# Patient Record
Sex: FEMALE | Race: WHITE | HISPANIC OR LATINO | Employment: FULL TIME | URBAN - METROPOLITAN AREA
[De-identification: names, ages, dates, MRNs, and addresses within clinical notes are randomized per-mention and may not be internally consistent; named-entity substitution may affect disease eponyms.]

---
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
## 2025-03-16 ENCOUNTER — HEALTH MAINTENANCE LETTER (OUTPATIENT)
Age: 20
End: 2025-03-16

## 2025-05-11 ENCOUNTER — HOSPITAL ENCOUNTER (EMERGENCY)
Facility: HOSPITAL | Age: 20
Discharge: HOME | End: 2025-05-11
Admitting: EMERGENCY MEDICINE
Payer: COMMERCIAL

## 2025-05-11 ENCOUNTER — APPOINTMENT (OUTPATIENT)
Dept: RADIOLOGY | Facility: HOSPITAL | Age: 20
End: 2025-05-11
Attending: EMERGENCY MEDICINE
Payer: COMMERCIAL

## 2025-05-11 VITALS
DIASTOLIC BLOOD PRESSURE: 69 MMHG | TEMPERATURE: 99 F | OXYGEN SATURATION: 100 % | RESPIRATION RATE: 16 BRPM | SYSTOLIC BLOOD PRESSURE: 123 MMHG | HEART RATE: 86 BPM | BODY MASS INDEX: 28.07 KG/M2 | HEIGHT: 63 IN | WEIGHT: 158.4 LBS

## 2025-05-11 DIAGNOSIS — J06.9 VIRAL URI: ICD-10-CM

## 2025-05-11 PROBLEM — L21.9 SEBORRHEIC DERMATITIS: Status: ACTIVE | Noted: 2023-08-29

## 2025-05-11 PROBLEM — F33.1 MAJOR DEPRESSIVE DISORDER, RECURRENT, MODERATE (H): Status: ACTIVE | Noted: 2025-04-09

## 2025-05-11 PROBLEM — F43.23 ADJUSTMENT DISORDER WITH MIXED ANXIETY AND DEPRESSED MOOD: Status: ACTIVE | Noted: 2023-08-29

## 2025-05-11 LAB
FLUAV RNA SPEC QL NAA+PROBE: NEGATIVE
FLUBV RNA RESP QL NAA+PROBE: NEGATIVE
RSV RNA SPEC NAA+PROBE: NEGATIVE
S PYO DNA THROAT QL NAA+PROBE: NOT DETECTED
SARS-COV-2 RNA RESP QL NAA+PROBE: NEGATIVE

## 2025-05-11 PROCEDURE — 87637 SARSCOV2&INF A&B&RSV AMP PRB: CPT | Performed by: EMERGENCY MEDICINE

## 2025-05-11 PROCEDURE — 71046 X-RAY EXAM CHEST 2 VIEWS: CPT

## 2025-05-11 PROCEDURE — 99284 EMERGENCY DEPT VISIT MOD MDM: CPT | Mod: 25 | Performed by: EMERGENCY MEDICINE

## 2025-05-11 PROCEDURE — 87651 STREP A DNA AMP PROBE: CPT | Performed by: EMERGENCY MEDICINE

## 2025-05-11 RX ORDER — BENZONATATE 200 MG/1
200 CAPSULE ORAL 3 TIMES DAILY PRN
Qty: 15 CAPSULE | Refills: 0 | Status: SHIPPED | OUTPATIENT
Start: 2025-05-11

## 2025-05-11 ASSESSMENT — ENCOUNTER SYMPTOMS
SHORTNESS OF BREATH: 0
NAUSEA: 0
COUGH: 1
VOMITING: 1
ABDOMINAL PAIN: 0
SORE THROAT: 1
DIARRHEA: 0
FEVER: 0

## 2025-05-11 ASSESSMENT — ACTIVITIES OF DAILY LIVING (ADL)
ADLS_ACUITY_SCORE: 41
ADLS_ACUITY_SCORE: 41

## 2025-05-11 ASSESSMENT — COLUMBIA-SUICIDE SEVERITY RATING SCALE - C-SSRS
6. HAVE YOU EVER DONE ANYTHING, STARTED TO DO ANYTHING, OR PREPARED TO DO ANYTHING TO END YOUR LIFE?: NO
1. IN THE PAST MONTH, HAVE YOU WISHED YOU WERE DEAD OR WISHED YOU COULD GO TO SLEEP AND NOT WAKE UP?: NO
2. HAVE YOU ACTUALLY HAD ANY THOUGHTS OF KILLING YOURSELF IN THE PAST MONTH?: NO

## 2025-05-12 NOTE — ED TRIAGE NOTES
Cough, congestion, sore throat, chills since yesterday     Triage Assessment (Adult)       Row Name 05/11/25 2018          Triage Assessment    Airway WDL WDL        Respiratory WDL    Respiratory WDL X;cough        Skin Circulation/Temperature WDL    Skin Circulation/Temperature WDL WDL        Cardiac WDL    Cardiac WDL X;rhythm     Pulse Rate & Regularity tachycardic        Peripheral/Neurovascular WDL    Peripheral Neurovascular WDL WDL        Cognitive/Neuro/Behavioral WDL    Cognitive/Neuro/Behavioral WDL WDL

## 2025-05-12 NOTE — DISCHARGE INSTRUCTIONS
You were seen in the emergency department today for evaluation of respiratory symptoms.  Your respiratory swab was negative for covid, flu, and RSV. Your xray was negative with no evidence of pneumonia.    Take over-the-counter medications like DayQuil/NyQuil to help with your symptoms.  You may also take ibuprofen in addition to these medicines for pain or fever.  I would recommend an oral decongestant like Sudafed or Mucinex D as well as a nasal spray like Flonase or Afrin for 3 days. If you have a history of high blood pressure, take Coricidin instead of sudafed or mucinex.    Use honey to help with coughing. I prescribed you tessalon perles for coughing also-take up to three times daily.     Follow-up with your primary care provider next week for recheck.  Return here for any new or worsening symptoms including severe pain, difficulty breathing, persistent vomiting, coughing up blood, or any other symptoms that concern you.

## 2025-06-11 ENCOUNTER — APPOINTMENT (OUTPATIENT)
Dept: ULTRASOUND IMAGING | Facility: HOSPITAL | Age: 20
End: 2025-06-11
Attending: EMERGENCY MEDICINE
Payer: COMMERCIAL

## 2025-06-11 LAB
ALBUMIN SERPL BCG-MCNC: 4.2 G/DL (ref 3.5–5.2)
ALP SERPL-CCNC: 78 U/L (ref 40–150)
ALT SERPL W P-5'-P-CCNC: 13 U/L (ref 0–50)
ANION GAP SERPL CALCULATED.3IONS-SCNC: 10 MMOL/L (ref 7–15)
AST SERPL W P-5'-P-CCNC: 15 U/L (ref 0–35)
BILIRUB SERPL-MCNC: 0.3 MG/DL
BUN SERPL-MCNC: 9.6 MG/DL (ref 6–20)
CALCIUM SERPL-MCNC: 9.4 MG/DL (ref 8.8–10.4)
CHLORIDE SERPL-SCNC: 107 MMOL/L (ref 98–107)
CREAT SERPL-MCNC: 0.75 MG/DL (ref 0.51–0.95)
EGFRCR SERPLBLD CKD-EPI 2021: >90 ML/MIN/1.73M2
ERYTHROCYTE [DISTWIDTH] IN BLOOD BY AUTOMATED COUNT: 12.7 % (ref 10–15)
GLUCOSE SERPL-MCNC: 94 MG/DL (ref 70–99)
HCG SERPL QL: NEGATIVE
HCO3 SERPL-SCNC: 25 MMOL/L (ref 22–29)
HCT VFR BLD AUTO: 41.3 % (ref 35–47)
HGB BLD-MCNC: 13.2 G/DL (ref 11.7–15.7)
LIPASE SERPL-CCNC: 27 U/L (ref 13–60)
MCH RBC QN AUTO: 29.7 PG (ref 26.5–33)
MCHC RBC AUTO-ENTMCNC: 32 G/DL (ref 31.5–36.5)
MCV RBC AUTO: 93 FL (ref 78–100)
PLATELET # BLD AUTO: 272 10E3/UL (ref 150–450)
POTASSIUM SERPL-SCNC: 4.2 MMOL/L (ref 3.4–5.3)
PROT SERPL-MCNC: 6.8 G/DL (ref 6.4–8.3)
RBC # BLD AUTO: 4.44 10E6/UL (ref 3.8–5.2)
SODIUM SERPL-SCNC: 142 MMOL/L (ref 135–145)
WBC # BLD AUTO: 9.6 10E3/UL (ref 4–11)

## 2025-06-11 PROCEDURE — 84703 CHORIONIC GONADOTROPIN ASSAY: CPT | Performed by: EMERGENCY MEDICINE

## 2025-06-11 PROCEDURE — 258N000003 HC RX IP 258 OP 636: Performed by: EMERGENCY MEDICINE

## 2025-06-11 PROCEDURE — 96375 TX/PRO/DX INJ NEW DRUG ADDON: CPT

## 2025-06-11 PROCEDURE — 84155 ASSAY OF PROTEIN SERUM: CPT | Performed by: EMERGENCY MEDICINE

## 2025-06-11 PROCEDURE — 96361 HYDRATE IV INFUSION ADD-ON: CPT

## 2025-06-11 PROCEDURE — 76830 TRANSVAGINAL US NON-OB: CPT

## 2025-06-11 PROCEDURE — 36415 COLL VENOUS BLD VENIPUNCTURE: CPT | Performed by: EMERGENCY MEDICINE

## 2025-06-11 PROCEDURE — 99285 EMERGENCY DEPT VISIT HI MDM: CPT | Mod: 25

## 2025-06-11 PROCEDURE — 85027 COMPLETE CBC AUTOMATED: CPT | Performed by: EMERGENCY MEDICINE

## 2025-06-11 PROCEDURE — 250N000011 HC RX IP 250 OP 636: Performed by: EMERGENCY MEDICINE

## 2025-06-11 PROCEDURE — 83690 ASSAY OF LIPASE: CPT | Performed by: EMERGENCY MEDICINE

## 2025-06-11 PROCEDURE — 96374 THER/PROPH/DIAG INJ IV PUSH: CPT

## 2025-06-11 RX ORDER — ONDANSETRON 2 MG/ML
4 INJECTION INTRAMUSCULAR; INTRAVENOUS ONCE
Status: COMPLETED | OUTPATIENT
Start: 2025-06-11 | End: 2025-06-11

## 2025-06-11 RX ORDER — KETOROLAC TROMETHAMINE 15 MG/ML
15 INJECTION, SOLUTION INTRAMUSCULAR; INTRAVENOUS ONCE
Status: COMPLETED | OUTPATIENT
Start: 2025-06-11 | End: 2025-06-11

## 2025-06-11 RX ADMIN — SODIUM CHLORIDE 1000 ML: 0.9 INJECTION, SOLUTION INTRAVENOUS at 21:54

## 2025-06-11 RX ADMIN — KETOROLAC TROMETHAMINE 15 MG: 15 INJECTION, SOLUTION INTRAMUSCULAR; INTRAVENOUS at 21:54

## 2025-06-11 RX ADMIN — ONDANSETRON 4 MG: 2 INJECTION, SOLUTION INTRAMUSCULAR; INTRAVENOUS at 21:57

## 2025-06-11 RX ADMIN — FAMOTIDINE 20 MG: 10 INJECTION, SOLUTION INTRAVENOUS at 22:01

## 2025-06-11 ASSESSMENT — COLUMBIA-SUICIDE SEVERITY RATING SCALE - C-SSRS
6. HAVE YOU EVER DONE ANYTHING, STARTED TO DO ANYTHING, OR PREPARED TO DO ANYTHING TO END YOUR LIFE?: NO
2. HAVE YOU ACTUALLY HAD ANY THOUGHTS OF KILLING YOURSELF IN THE PAST MONTH?: NO
1. IN THE PAST MONTH, HAVE YOU WISHED YOU WERE DEAD OR WISHED YOU COULD GO TO SLEEP AND NOT WAKE UP?: NO

## 2025-06-12 ENCOUNTER — HOSPITAL ENCOUNTER (EMERGENCY)
Facility: HOSPITAL | Age: 20
Discharge: HOME | End: 2025-06-12
Attending: EMERGENCY MEDICINE
Payer: COMMERCIAL

## 2025-06-12 VITALS
SYSTOLIC BLOOD PRESSURE: 126 MMHG | HEART RATE: 76 BPM | TEMPERATURE: 97.6 F | RESPIRATION RATE: 18 BRPM | DIASTOLIC BLOOD PRESSURE: 76 MMHG | OXYGEN SATURATION: 100 %

## 2025-06-12 DIAGNOSIS — N93.9 ABNORMAL UTERINE BLEEDING: ICD-10-CM

## 2025-06-12 DIAGNOSIS — R11.0 NAUSEA: ICD-10-CM

## 2025-06-12 NOTE — ED PROVIDER NOTES
EMERGENCY DEPARTMENT ENCOUNTER      NAME: Claudio Maurice  AGE: 19 year old female  YOB: 2005  EVALUATION DATE & TIME: No admission date for patient encounter.    ED PROVIDER: Audra Escalante MD    Chief Complaint   Patient presents with    Vaginal Bleeding    Vomiting       FINAL IMPRESSION  1. Nausea    2. Abnormal uterine bleeding        MEDICAL DECISION MAKING   Claudio Maurice is a 19 year old female who presents with significant other for evaluation of ongoing vaginal bleeding and nausea.  Patient reports having light vaginal bleeding for the last 2 weeks, starting after LMP in the middle of last month.  Additionally, she has been experiencing some nausea and upper abdominal discomfort for the last few days.  She had an IUD placed on 4/9/2025.  She reports associated chills but no fevers, diarrhea, urinary symptoms, vaginal discharge or concern for pregnancy or STD.  No recent travel or sick contacts.    Outside records reviewed.  Patient seen in the ED 12/1/2024 with vaginal bleeding.  Also seen for/9/25 for insertion of IUD.  I reviewed that note.    Considered a broad differential including but not limited to dysfunctional/abnormal uterine bleeding, uterine fibroids/polyps, ovarian cyst/torsion, malignancy, coagulopathy, anemia, gastroenteritis, gastritis, peptic ulcer disease, GERD, hepatobiliary disease.  Patient is not concerned about pregnancy so I have low suspicion for ruptured ectopic or placental abnormalities.  Low suspicion for other intra-abdominal/surgical process given history and exam.  Discussed options for work-up and management with patient.  We have agreed on plan to start with labs including type and screen and ultrasound of pelvis.  I did explain to the patient that if she has ongoing pain or concerning lab findings, may need to proceed with CT abdomen/pelvis as well.    ED Course as of 06/12/25 0608   Wed Jun 11, 2025   2222 Comprehensive metabolic panel  CMP  reassuring. No evidence of LAURA, acidosis, or significant electrolyte derangement. No acute elevation of bilirubin or transaminates to suggest acute hepatobiliary process.   2222 CBC (+ platelets, no diff)  CBC reassuring. No evidence of leukocytosis to suggest systemic infectious/inflammatory process. No acute anemia. PLTs wnl.   2222 Lipase: 27  Lipase within normal limits, symptoms less likely related to acute pancreatitis.   Thu Jun 12, 2025   0002 US Pelvic Complete with Transvaginal  IUD in expected position.  No acute process to explain symptoms     Workup was notable for the above. I rechecked the patient multiple times and reviewed results.  Patient had resolution of symptoms after initial interventions.  She has remained hemodynamically stable here and was comfortable with plan for discharge.  Unclear etiology of the abnormal uterine bleeding oncology team.  She feels comfortable reaching out to Dr. Deras who inserted the IUD.  In the meantime, we will continue conservative management of symptoms.  I did offer a prescription for Zofran but she declined.    At the end of the encounter, we reviewed the results, potential diagnoses, as well as return precautions and recommendations for follow up. I instructed Ms. Maurice to return to the emergency department immediately if she develops any new or worsening symptoms and provided additional verbal discharge instructions. Ms. Maurice expressed understanding and agreement with this plan of care, her questions were answered, and she was discharged in stable condition.      Additional Considerations in MDM  History:  Supplemental history from: The patient  External Record(s) reviewed: Gynecology/9/25, PCP 2/4/2025, ED 12/1/2024    Work Up:  Chart documentation includes differential diagnoses considered and any EKGs or imaging independently interpreted as specified above.   In additional to work up documented, I considered additional advanced imaging and  "laboratory workup but deferred after shared decision making conversation with patient/family    External Consultation(s):  Discussion of management with another provider as documented above and in ED course     Chronic Illness(es):  Care impacted by chronic illness(es): MDD    Disposition considerations: Discharge. I discussed a prescription for zofran, but deferred after shared decision making discussion.. I considered admission, but discharged patient after significant clinical improvement.    MIPS: Not Applicable       Sepsis/STEMI/Stroke: None        ED COURSE  9:14 PM I met with the patient, obtained history, performed an initial exam, and discussed options and plan for diagnostics and treatment here in the ED.  12:06 AM I rechecked the patient and updated her on the lab and imaging results. Discussed plan for discharge to home.    MEDICATIONS GIVEN IN THE ED  Medications   sodium chloride 0.9% BOLUS 1,000 mL (0 mLs Intravenous Stopped 6/12/25 0009)   ketorolac (TORADOL) injection 15 mg (15 mg Intravenous $Given 6/11/25 2154)   ondansetron (ZOFRAN) injection 4 mg (4 mg Intravenous $Given 6/11/25 2157)   famotidine (PEPCID) injection 20 mg (20 mg Intravenous $Given 6/11/25 2201)       NEW PRESCRIPTIONS STARTED AT TODAY'S VISIT  Discharge Medication List as of 6/12/2025 12:13 AM             =================================================================    HPI:    Use of : N/A     Claudio Maurice is a 19 year old female who presents continuous vaginal bleeding and episodes of emesis.    The patient reports that she's been persistently vomiting for the past 2 days now. Notes that she's been continuously vaginally bleeding \"nonstop\" over the course of the last 2 weeks, and has been experiencing abdominal cramping as well. Today (06/11/2025), she began feeling really lightheaded. Most of her abdominal pain is localized across her lower abdomen diffusely, and she explains that she does not have " abdominal pain across her upper abdomen. She recalls that her last menstrual period started last month on May 12, 2025. She then started vaginally bleeding one week after her period finished, and later on she began spotting, and subsequently she started bleeding again. She confirms that she has an IUD in place and notes that she had this placed on April 9, 2025. She otherwise denies she's been having any abnormal vaginal discharge. No fevers or episodes of diarrhea. She endorses having chills. She denies eating new foods or any traveling recently.    States that she has bleed vaginally outside of her period before, but states that it did not seem like what she is experiencing now. She took one dose of Tylenol and one dose of ibuprofen as well. She denies experiencing any burning sensation when she urinates, or having any other urinary symptoms.    She has a history of ovarian cysts, and reports that this does not feel like this. Denies having any allergies to medications. No additional complaints or concerns reported at this time.    RELEVANT HISTORY, MEDICATIONS, & ALLERGIES   Past medical history, surgical history, family history, medications, and allergies reviewed and pertinent noted in HPI.    REVIEW OF SYSTEMS:  A complete review of systems was performed with pertinent positives and negatives noted in the HPI.     PHYSICAL EXAM:    Vitals: /76   Pulse 76   Temp 97.6  F (36.4  C)   Resp 18   SpO2 100%    General: Alert and interactive, comfortable appearing.  HENT: Atraumatic. Full AROM of neck. MMM.  Cardiovascular: Regular rate and rhythm.   Chest/Pulmonary: Normal work of breathing. Speaking in complete sentences. Lungs CTAB. No chest wall tenderness or deformities.  Abdomen: Soft, nondistended. Nontender without guarding or rebound.  Extremities: Normal AROM of all major joints.  Skin: Warm and dry. Normal skin color.   Neuro: Speech clear. CNs grossly intact. Moves all extremities spontaneously.    Psych: Normal affect/mood, cooperative, memory appropriate.      LAB  Labs Ordered and Resulted from Time of ED Arrival to Time of ED Departure   CBC WITH PLATELETS - Normal       Result Value    WBC Count 9.6      RBC Count 4.44      Hemoglobin 13.2      Hematocrit 41.3      MCV 93      MCH 29.7      MCHC 32.0      RDW 12.7      Platelet Count 272     COMPREHENSIVE METABOLIC PANEL - Normal    Sodium 142      Potassium 4.2      Carbon Dioxide (CO2) 25      Anion Gap 10      Urea Nitrogen 9.6      Creatinine 0.75      GFR Estimate >90      Calcium 9.4      Chloride 107      Glucose 94      Alkaline Phosphatase 78      AST 15      ALT 13      Protein Total 6.8      Albumin 4.2      Bilirubin Total 0.3     LIPASE - Normal    Lipase 27     HCG QUALITATIVE PREGNANCY - Normal    hCG Serum Qualitative Negative         RADIOLOGY  US Pelvic Complete with Transvaginal   Final Result   IMPRESSION:   1.  Intrauterine contraceptive device in expected position.      2.  Possible left ovarian corpus luteum. Technically challenging Doppler evaluation of the right ovary. There is color Doppler flow within the left ovary.                   EKG  N/A      PROCEDURES  N/A      I, Jazmyn Rojas, am serving as a scribe to document services personally performed by Dr. Audra Escalante based on my observation and the provider's statements to me. I, Audra Escalante MD attest that Jazmyn Rojas is acting in a scribe capacity, has observed my performance of the services and has documented them in accordance with my direction.    Audra Escalante M.D.  Emergency Medicine  [facility] EMERGENCY DEPARTMENT  [facility] [facility] [address]  [phone]  Dept: [phone]      Audra Escalante MD  06/12/25 0608

## 2025-06-12 NOTE — DISCHARGE INSTRUCTIONS
You were seen in the Emergency Department today for abdominal pain, vaginal bleeding, and nausea.    Like we talked about, your ultrasound today looked okay.  The IUD is in the right position and I did not see any ovarian cysts.    For pain and/or fever:  Ibuprofen (Motrin, Advil): 600 mg (3 over-the-counter pills) every 6 hours as needed.   - Helps with swelling and inflammation that can cause pain.   - Take scheduled every 6 hours for 3 days, then every 6 hours as needed. Do not take for more than 7 days without talking to your doctor.   - Always take with a snack.     Acetaminophen (Tylenol): 1000 mg (2 extra strength (500 mg) over-the-counter pills or OR 3 regular strength (325 mg) over-the-counter pills) every 6 hours as needed.  - Helps with pain, and works synergistically (meaning the sum of both medications together helps better than either medicine on their own)    You can alternate Ibuprofen (Motrin, Advil) and Acetaminophen (Tylenol) so you are taking something every 3 hours. For example:  Take ibuprofen at 7 AM, 1 PM, 7 PM  Take acetaminophen at 10 AM, 4 PM, 10 PM    Please return to the ER if you experience fever, inability to keep food/fluids down, worsening pain, and/or for any other new or concerning symptoms, otherwise please follow up with your gynecology for recheck.     Below is some information you might find useful.     Thank you for choosing [facility]. It was a pleasure taking care of you today!  - Dr. Audra Escalante

## 2025-06-12 NOTE — ED TRIAGE NOTES
Patient arrives by private car for evaluation of lower abdominal pain with vaginal bleeding.  Patient has IUD placed in April.  Has been have vaginal bleeding for 2 weeks.   Has had intermittent nausea and vomiting.

## 2025-06-16 NOTE — ED PROVIDER NOTES
EMERGENCY DEPARTMENT ENCOUNTER      NAME: Claudio Maurice  AGE: 19 year old female  YOB: 2005  MRN: [id number]  EVALUATION DATE & TIME: 5/11/2025  8:36 PM    PCP: [facility]    ED PROVIDER: Roopa Cordova PA-C      Chief Complaint   Patient presents with    Flu Symptoms     Cough, congestion, sore throat, chills since yesterday         FINAL IMPRESSION:  1. Viral URI          ED COURSE & MEDICAL DECISION MAKING:    Pertinent Labs & Imaging studies reviewed. (See chart for details)    19 year old female presents to the Emergency Department for evaluation of cough.    Physical exam is remarkable for a generally well-appearing female who is in no acute distress.  Heart and lung sounds are clear diffusely throughout.  Nasal congestion is noted.  Oropharynx is unremarkable appearing.  Vital signs are stable and she is afebrile.    Influenza/COVID/RSV swab negative.  Chest x-ray unremarkable with no evidence of pneumonia.    I do not think any further emergent labs or imaging are indicated at this time.  The patient is overall well-appearing here and hemodynamically stable.  She has clear breath sounds, is oxygenating at 100% on room air, does not appear to be in respiratory distress. Patient denies any significant chest pain, shortness of breath, or hemoptysis.  Oropharynx without evidence of abscess.  Symptoms are most consistent with a viral upper respiratory infection, recommend over-the-counter medications for treatment and I prescribed her Tessalon Perles.  Advised follow-up with her primary care provider for recheck if needed and return here for any new or worsening symptoms.  The patient is agreeable with this treatment plan and verbalized understanding.    Medical Decision Making  I independently interpreted the xray and note no infiltrates. See radiology report for final interpretation.  Discharge. I prescribed additional prescription strength medication(s) as  charted. N/A.    MIPS (CTPE, Dental pain, Isabel, Sinusitis, Asthma/COPD, Head Trauma): Not Applicable    SEPSIS: None        ED Course   9:51 PM Performed my initial history and physical exam. Discussed workup in the emergency department, management of symptoms, and likely disposition.   9:55 PM I discussed the plan for discharge with the patient or family and they are agreeable.. We discussed supportive cares at home and reasons for return to the ER including new or worsening symptoms - all questions and concerns addressed. Patient to be discharged by RN.    At the conclusion of the encounter I discussed the results of all of the tests and the disposition. The questions were answered. The patient or family acknowledged understanding and was agreeable with the care plan.     Voice recognition software was used in the creation of this note. Any grammatical or nonsensical errors are due to inherent errors with the software and are not the intention of the writer.     MEDICATIONS GIVEN IN THE EMERGENCY:  Medications - No data to display    NEW PRESCRIPTIONS STARTED AT TODAY'S ER VISIT  New Prescriptions    BENZONATATE (TESSALON) 200 MG CAPSULE    Take 1 capsule (200 mg) by mouth 3 times daily as needed for cough.            =================================================================    HPI    Patient information was obtained from: patient     Use of : N/A         Claudio Maurice is a 19 year old female who presents by walk-in for evaluation of flu-like symptoms.    Patient woke up yesterday morning with a cough, post-tussive emesis, sore throat, and congestion. The sore throat has improved today, but the cough and vomiting have persisted. Patient took tylenol last night without relief. She also tried benadryl without relief. Denies chest pain, shortness of breath, fever, diarrhea, recent travel, and any other symptoms or complaints at this time.     ScHx: Patient occasionally smokes or  "vapes.      REVIEW OF SYSTEMS   Review of Systems   Constitutional:  Negative for fever.   HENT:  Positive for congestion and sore throat.    Respiratory:  Positive for cough. Negative for shortness of breath.    Cardiovascular:  Negative for chest pain.   Gastrointestinal:  Positive for vomiting. Negative for abdominal pain, diarrhea and nausea.       All other systems reviewed and are negative unless noted in HPI.      PAST MEDICAL HISTORY:  History reviewed. No pertinent past medical history.    PAST SURGICAL HISTORY:  History reviewed. No pertinent surgical history.    CURRENT MEDICATIONS:    benzonatate (TESSALON) 200 MG capsule  albuterol (PROAIR HFA/PROVENTIL HFA/VENTOLIN HFA) 108 (90 Base) MCG/ACT inhaler        ALLERGIES:  No Known Allergies    FAMILY HISTORY:  No family history on file.    SOCIAL HISTORY:   Social History     Socioeconomic History    Marital status: Single     Social Drivers of Health     Food Insecurity: Food Insecurity Present (9/24/2024)    Received from [employer]    Hunger Vital Sign     Worried About Running Out of Food in the Last Year: Sometimes true     Ran Out of Food in the Last Year: Sometimes true   Transportation Needs: No Transportation Needs (9/24/2024)    Received from [employer]    PRAPARE - Transportation     Lack of Transportation (Medical): No     Lack of Transportation (Non-Medical): No   Housing Stability: Low Risk  (9/24/2024)    Received from [employer]    Housing Stability Vital Sign     Unable to Pay for Housing in the Last Year: No     Number of Times Moved in the Last Year: 1     Homeless in the Last Year: No       VITALS:  Patient Vitals for the past 24 hrs:   BP Temp Temp src Pulse Resp SpO2 Height Weight   05/11/25 2017 (!) 153/92 99  F (37.2  C) Oral 104 18 100 % 1.6 m (5' 3\") 71.8 kg (158 lb 6.4 oz)       PHYSICAL EXAM    VITAL SIGNS: BP (!) 153/92   Pulse 104   Temp 99  F (37.2  C) (Oral)   Resp 18   Ht 1.6 m (5' 3\")   Wt 71.8 kg (158 lb " 6.4 oz)   SpO2 100%   BMI 28.06 kg/m    General Appearance: Alert, cooperative, normal speech and facial symmetry, appears stated age, the patient does not appear in distress  Head:  Normocephalic, without obvious abnormality, atraumatic  ENT: Nasal congestion; Lips, mucosa, and tongue normal; teeth and gums normal, no pharyngeal inflammation, no dysphonia or difficulty swallowing, membranes are moist without pallor  Cardio:  Regular rate and rhythm, S1 and S2 normal, no murmur, rub    or gallop, 2+ pulses symmetric in all extremities  Pulm:  Clear to auscultation bilaterally, respirations unlabored with no accessory muscle use  Extremities: Moves all extremities  Neuro: Patient is awake, alert, and responsive to voice. No gross motor weaknesses or sensory loss; moves all extremities.    LAB:  All pertinent labs reviewed and interpreted.  Labs Ordered and Resulted from Time of ED Arrival to Time of ED Departure   INFLUENZA A/B, RSV AND SARS-COV2 PCR - Normal       Result Value    Influenza A PCR Negative      Influenza B PCR Negative      RSV PCR Negative      SARS CoV2 PCR Negative     GROUP A STREPTOCOCCUS PCR THROAT SWAB - Normal    Group A strep by PCR Not Detected         RADIOLOGY:  Reviewed all pertinent imaging. Please see official radiology report.  Chest XR,  PA & LAT   Final Result   IMPRESSION: Negative chest.  The lungs are clear and there are no pleural effusions. Normal heart size.          I, Steff Jones, am serving as a scribe to document services personally performed by Roopa Cordova PA-C based on my observation and the provider's statements to me. I, Roopa Cordova PA-C attest that Steff Jones is acting in a scribe capacity, has observed my performance of the services and has documented them in accordance with my direction.     Roopa Cordova PA-C  Emergency Medicine  [facility] EMERGENCY DEPARTMENT  [address]  [facility] [address]  [phone]  Dept: [phone]       Roopa Cordova PA-C  05/11/25 2215     Partially impaired: cannot see medication labels or newsprint, but can see obstacles in path, and the surrounding layout; can count fingers at arm's length